# Patient Record
Sex: MALE | Race: WHITE | NOT HISPANIC OR LATINO | URBAN - METROPOLITAN AREA
[De-identification: names, ages, dates, MRNs, and addresses within clinical notes are randomized per-mention and may not be internally consistent; named-entity substitution may affect disease eponyms.]

---

## 2018-08-20 ENCOUNTER — HOSPITAL ENCOUNTER (EMERGENCY)
Facility: MEDICAL CENTER | Age: 83
End: 2018-08-20
Attending: EMERGENCY MEDICINE
Payer: MEDICARE

## 2018-08-20 VITALS
RESPIRATION RATE: 30 BRPM | OXYGEN SATURATION: 97 % | DIASTOLIC BLOOD PRESSURE: 93 MMHG | HEART RATE: 99 BPM | WEIGHT: 264 LBS | TEMPERATURE: 97.6 F | BODY MASS INDEX: 37.8 KG/M2 | SYSTOLIC BLOOD PRESSURE: 143 MMHG | HEIGHT: 70 IN

## 2018-08-20 DIAGNOSIS — D72.829 LEUKOCYTOSIS, UNSPECIFIED TYPE: ICD-10-CM

## 2018-08-20 DIAGNOSIS — R00.0 TACHYCARDIA: ICD-10-CM

## 2018-08-20 DIAGNOSIS — S81.802A WOUND OF LEFT LOWER EXTREMITY, INITIAL ENCOUNTER: ICD-10-CM

## 2018-08-20 LAB
ALBUMIN SERPL BCP-MCNC: 4.2 G/DL (ref 3.2–4.9)
ALBUMIN/GLOB SERPL: 1.4 G/DL
ALP SERPL-CCNC: 60 U/L (ref 30–99)
ALT SERPL-CCNC: 5 U/L (ref 2–50)
ANION GAP SERPL CALC-SCNC: 8 MMOL/L (ref 0–11.9)
ANISOCYTOSIS BLD QL SMEAR: ABNORMAL
AST SERPL-CCNC: 11 U/L (ref 12–45)
BASOPHILS # BLD AUTO: 0 % (ref 0–1.8)
BASOPHILS # BLD: 0 K/UL (ref 0–0.12)
BILIRUB SERPL-MCNC: 0.8 MG/DL (ref 0.1–1.5)
BUN SERPL-MCNC: 21 MG/DL (ref 8–22)
CALCIUM SERPL-MCNC: 9.4 MG/DL (ref 8.5–10.5)
CHLORIDE SERPL-SCNC: 107 MMOL/L (ref 96–112)
CO2 SERPL-SCNC: 23 MMOL/L (ref 20–33)
CREAT SERPL-MCNC: 1.22 MG/DL (ref 0.5–1.4)
DACRYOCYTES BLD QL SMEAR: NORMAL
EOSINOPHIL # BLD AUTO: 0 K/UL (ref 0–0.51)
EOSINOPHIL NFR BLD: 0 % (ref 0–6.9)
ERYTHROCYTE [DISTWIDTH] IN BLOOD BY AUTOMATED COUNT: 38.5 FL (ref 35.9–50)
GLOBULIN SER CALC-MCNC: 2.9 G/DL (ref 1.9–3.5)
GLUCOSE SERPL-MCNC: 110 MG/DL (ref 65–99)
HCT VFR BLD AUTO: 32.4 % (ref 42–52)
HGB BLD-MCNC: 9.6 G/DL (ref 14–18)
HYPOCHROMIA BLD QL SMEAR: ABNORMAL
LG PLATELETS BLD QL SMEAR: NORMAL
LYMPHOCYTES # BLD AUTO: 1.48 K/UL (ref 1–4.8)
LYMPHOCYTES NFR BLD: 11.2 % (ref 22–41)
MANUAL DIFF BLD: NORMAL
MCH RBC QN AUTO: 18.7 PG (ref 27–33)
MCHC RBC AUTO-ENTMCNC: 29.6 G/DL (ref 33.7–35.3)
MCV RBC AUTO: 63 FL (ref 81.4–97.8)
METAMYELOCYTES NFR BLD MANUAL: 0.9 %
MICROCYTES BLD QL SMEAR: ABNORMAL
MONOCYTES # BLD AUTO: 1.7 K/UL (ref 0–0.85)
MONOCYTES NFR BLD AUTO: 12.9 % (ref 0–13.4)
MORPHOLOGY BLD-IMP: NORMAL
NEUTROPHILS # BLD AUTO: 9.9 K/UL (ref 1.82–7.42)
NEUTROPHILS NFR BLD: 75 % (ref 44–72)
NRBC # BLD AUTO: 0.02 K/UL
NRBC BLD-RTO: 0.2 /100 WBC
PLATELET # BLD AUTO: 159 K/UL (ref 164–446)
PLATELET BLD QL SMEAR: NORMAL
POIKILOCYTOSIS BLD QL SMEAR: NORMAL
POLYCHROMASIA BLD QL SMEAR: NORMAL
POTASSIUM SERPL-SCNC: 3.9 MMOL/L (ref 3.6–5.5)
PROT SERPL-MCNC: 7.1 G/DL (ref 6–8.2)
RBC # BLD AUTO: 5.14 M/UL (ref 4.7–6.1)
RBC BLD AUTO: PRESENT
SODIUM SERPL-SCNC: 138 MMOL/L (ref 135–145)
WBC # BLD AUTO: 13.2 K/UL (ref 4.8–10.8)

## 2018-08-20 PROCEDURE — 700102 HCHG RX REV CODE 250 W/ 637 OVERRIDE(OP): Performed by: EMERGENCY MEDICINE

## 2018-08-20 PROCEDURE — 36415 COLL VENOUS BLD VENIPUNCTURE: CPT

## 2018-08-20 PROCEDURE — 99284 EMERGENCY DEPT VISIT MOD MDM: CPT

## 2018-08-20 PROCEDURE — 80053 COMPREHEN METABOLIC PANEL: CPT

## 2018-08-20 PROCEDURE — 85007 BL SMEAR W/DIFF WBC COUNT: CPT

## 2018-08-20 PROCEDURE — 85027 COMPLETE CBC AUTOMATED: CPT

## 2018-08-20 PROCEDURE — 93971 EXTREMITY STUDY: CPT

## 2018-08-20 PROCEDURE — A9270 NON-COVERED ITEM OR SERVICE: HCPCS | Performed by: EMERGENCY MEDICINE

## 2018-08-20 RX ORDER — CEPHALEXIN 500 MG/1
500 CAPSULE ORAL 4 TIMES DAILY
Qty: 28 CAP | Refills: 0 | Status: SHIPPED | OUTPATIENT
Start: 2018-08-20 | End: 2018-08-27

## 2018-08-20 RX ORDER — CEPHALEXIN 500 MG/1
500 CAPSULE ORAL ONCE
Status: COMPLETED | OUTPATIENT
Start: 2018-08-20 | End: 2018-08-20

## 2018-08-20 RX ORDER — SULFAMETHOXAZOLE AND TRIMETHOPRIM 800; 160 MG/1; MG/1
1 TABLET ORAL 2 TIMES DAILY
Qty: 14 TAB | Refills: 0 | Status: SHIPPED | OUTPATIENT
Start: 2018-08-20 | End: 2018-08-27

## 2018-08-20 RX ORDER — SULFAMETHOXAZOLE AND TRIMETHOPRIM 800; 160 MG/1; MG/1
1 TABLET ORAL ONCE
Status: COMPLETED | OUTPATIENT
Start: 2018-08-20 | End: 2018-08-20

## 2018-08-20 RX ADMIN — CEPHALEXIN 500 MG: 500 CAPSULE ORAL at 10:13

## 2018-08-20 RX ADMIN — SULFAMETHOXAZOLE AND TRIMETHOPRIM 1 TABLET: 800; 160 TABLET ORAL at 10:13

## 2018-08-20 ASSESSMENT — PAIN SCALES - GENERAL: PAINLEVEL_OUTOF10: 6

## 2018-08-20 NOTE — ED PROVIDER NOTES
"ED Provider Note    CHIEF COMPLAINT   Chief Complaint   Patient presents with   • Foot Pain   • Wound Check       HPI   Philippe Gottlieb is a 85 y.o. male who presents with complaint of swelling left leg, onset 1 week ago.  Patient had traveled to [place] to see his daughter, was evaluated in emergency department there, concern over a poorly healing wound to the left leg.  Patient finished course of antibiotics, presents today again for reevaluation.  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  Patient states he has borderline blood sugar problems, states \"it is under good control\".  He denies numbness to the legs.    Patient has eschar formation medial left ankle, he states previously was infected blister, now improved.  Swelling in the left leg has gradually worsened.  There is no new pain.  He states he has chronic left hip pain.  No new injury.    REVIEW OF SYSTEMS   Musculoskeletal: Left leg pain, chronic  Neurologic: Patient denies neuropathy, no new numbness  Skin: Slow healing wound left ankle.  Left leg swelling.  Cardiac no chest pain  Endocrine: \"Borderline diabetes\"  Constitutional: No fever  Respiratory: No shortness of breath, no pleurisy      PAST MEDICAL HISTORY   Past Medical History:   Diagnosis Date   • Diabetes (HCC)        FAMILY HISTORY  History reviewed. No pertinent family history.    SOCIAL HISTORY  Social History     Social History   • Marital status: N/A     Spouse name: N/A   • Number of children: N/A   • Years of education: N/A     Social History Main Topics   • Smoking status: Former Smoker   • Smokeless tobacco: Never Used   • Alcohol use No   • Drug use: No   • Sexual activity: Not on file     Other Topics Concern   • Not on file     Social History Narrative   • No narrative on file       SURGICAL HISTORY  History reviewed. No pertinent surgical history.    CURRENT MEDICATIONS   Home Medications     Reviewed by Rosalia Sheth R.N. (Registered Nurse) on 08/20/18 at 0815  " "Med List Status: Unable to Obtain   Medication Last Dose Status        Patient Rodriguez Taking any Medications                       ALLERGIES   Allergies   Allergen Reactions   • Aspirin Unspecified     States it made him dizzy 40 years ago       PHYSICAL EXAM  VITAL SIGNS: /93   Pulse (!) 104   Temp 36.4 °C (97.6 °F)   Resp 15   Ht 1.778 m (5' 10\")   Wt 119.7 kg (264 lb)   SpO2 93%   BMI 37.88 kg/m²   Skin: Swelling left leg compared to the right.  There is trace erythema adjacent to eschar circular approximately 2.5 cm in diameter.  No purulence, no fluctuance, no induration.  Vascular: Intact distal capillary refill.  Slightly weaker pulse left foot compared to the right.  Capillary refill intact both feet  Musculoskeletal: Negative Homans sign.  Calf musculature and thigh musculature nontender.  Neurologic: Sensation intact both feet    RADIOLOGY/PROCEDURES  LE VENOUS DUPLEX (Specify in Comments Left, Right Or Bilateral)   Final Result        Results for orders placed or performed during the hospital encounter of 08/20/18   CBC WITH DIFFERENTIAL   Result Value Ref Range    WBC 13.2 (H) 4.8 - 10.8 K/uL    RBC 5.14 4.70 - 6.10 M/uL    Hemoglobin 9.6 (L) 14.0 - 18.0 g/dL    Hematocrit 32.4 (L) 42.0 - 52.0 %    MCV 63.0 (L) 81.4 - 97.8 fL    MCH 18.7 (L) 27.0 - 33.0 pg    MCHC 29.6 (L) 33.7 - 35.3 g/dL    RDW 38.5 35.9 - 50.0 fL    Platelet Count 159 (L) 164 - 446 K/uL    Nucleated RBC 0.20 /100 WBC    NRBC (Absolute) 0.02 K/uL    Neutrophils-Polys 75.00 (H) 44.00 - 72.00 %    Lymphocytes 11.20 (L) 22.00 - 41.00 %    Monocytes 12.90 0.00 - 13.40 %    Eosinophils 0.00 0.00 - 6.90 %    Basophils 0.00 0.00 - 1.80 %    Neutrophils (Absolute) 9.90 (H) 1.82 - 7.42 K/uL    Lymphs (Absolute) 1.48 1.00 - 4.80 K/uL    Monos (Absolute) 1.70 (H) 0.00 - 0.85 K/uL    Eos (Absolute) 0.00 0.00 - 0.51 K/uL    Baso (Absolute) 0.00 0.00 - 0.12 K/uL    Hypochromia 1+     Anisocytosis 1+     Microcytosis 2+    COMP METABOLIC " PANEL   Result Value Ref Range    Sodium 138 135 - 145 mmol/L    Potassium 3.9 3.6 - 5.5 mmol/L    Chloride 107 96 - 112 mmol/L    Co2 23 20 - 33 mmol/L    Anion Gap 8.0 0.0 - 11.9    Glucose 110 (H) 65 - 99 mg/dL    Bun 21 8 - 22 mg/dL    Creatinine 1.22 0.50 - 1.40 mg/dL    Calcium 9.4 8.5 - 10.5 mg/dL    AST(SGOT) 11 (L) 12 - 45 U/L    ALT(SGPT) 5 2 - 50 U/L    Alkaline Phosphatase 60 30 - 99 U/L    Total Bilirubin 0.8 0.1 - 1.5 mg/dL    Albumin 4.2 3.2 - 4.9 g/dL    Total Protein 7.1 6.0 - 8.2 g/dL    Globulin 2.9 1.9 - 3.5 g/dL    A-G Ratio 1.4 g/dL   ESTIMATED GFR   Result Value Ref Range    GFR If African American >60 >60 mL/min/1.73 m 2    GFR If Non  56 (A) >60 mL/min/1.73 m 2   DIFFERENTIAL MANUAL   Result Value Ref Range    Metamyelocytes 0.90 %    Manual Diff Status PERFORMED    PERIPHERAL SMEAR REVIEW   Result Value Ref Range    Peripheral Smear Review see below    PLATELET ESTIMATE   Result Value Ref Range    Plt Estimation Decreased    MORPHOLOGY   Result Value Ref Range    RBC Morphology Present     Large Platelets 1+     Polychromia 1+     Poikilocytosis 1+     Tear Drop Cells 1+          COURSE & MEDICAL DECISION MAKING  Pertinent Labs & Imaging studies reviewed. (See chart for details)  reading of ultrasound negative for DVT.  I had a long discussion with the patient regarding of the fast heart rate, elevated white blood cells.  My recommendation was for further blood testing including blood culture, lactic acid, and admission to the hospital for IV antibiotics and to rule out sepsis.  Patient states he understands my concerns but is refusing admission to the hospital.  Patient has agreed to take antibiotics as an outpatient, was given first dose in the ER.  He states he will return if he feels worse, and has agreed to see his primary doctor soon as possible upon return home to [place].  This patient is alert and oriented, and has demonstrated the capacity to understand the risks  of leaving against my medical advice.  These risks have been explained as death or permanent disability.  The patient is aware they can return for any concerns or if they changes their mind about further testing and admission to the hospital.  The patient is aware they should follow up with their doctor as soon as possible.  No medical condition or medication appears to be clouding this patient's judgement at this time.    FINAL IMPRESSION     1. Wound of left lower extremity, initial encounter    2. Tachycardia    3. Leukocytosis, unspecified type              Electronically signed by: Darrin Rowley, 8/20/2018 8:35 AM

## 2018-08-20 NOTE — ED TRIAGE NOTES
84 y/o male bib wheelchair for evaluation of bilat foot pain and wound check. 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 Pt has a healing wound to his left ankle area, no other wounds visible during triage however the feet are malodorous. Pt is extremely hard of hearing.

## 2018-08-20 NOTE — DISCHARGE INSTRUCTIONS
Skin Infections  A skin infection usually develops as a result of disruption of the skin barrier.   CAUSES   A skin infection might occur following:  · Trauma or an injury to the skin such as a cut or insect sting.   · Inflammation (as in eczema).   · Breaks in the skin between the toes (as in athlete's foot).   · Swelling (edema).   SYMPTOMS   The legs are the most common site affected. Usually there is:  · Redness.   · Swelling.   · Pain.   · There may be red streaks in the area of the infection.   TREATMENT   · Minor skin infections may be treated with topical antibiotics, but if the skin infection is severe, hospital care and intravenous (IV) antibiotic treatment may be needed.   · Most often skin infections can be treated with oral antibiotic medicine as well as proper rest and elevation of the affected area until the infection improves.   · If you are prescribed oral antibiotics, it is important to take them as directed and to take all the pills even if you feel better before you have finished all of the medicine.   · You may apply warm compresses to the area for 20-30 minutes 4 times daily.   You might need a tetanus shot now if:  · You have no idea when you had the last one.   · You have never had a tetanus shot before.   · Your wound had dirt in it.   If you need a tetanus shot and you decide not to get one, there is a rare chance of getting tetanus. Sickness from tetanus can be serious. If you get a tetanus shot, your arm may swell and become red and warm at the shot site. This is common and not a problem.  SEEK MEDICAL CARE IF:   The pain and swelling from your infection do not improve within 2 days.   SEEK IMMEDIATE MEDICAL CARE IF:   You develop a fever, chills, or other serious problems.   Document Released: 01/25/2006 Document Revised: 03/11/2013 Document Reviewed: 12/07/2009  [employer]® Patient Information ©2013 [employer].